# Patient Record
Sex: FEMALE
[De-identification: names, ages, dates, MRNs, and addresses within clinical notes are randomized per-mention and may not be internally consistent; named-entity substitution may affect disease eponyms.]

---

## 2018-03-13 ENCOUNTER — HOSPITAL ENCOUNTER (EMERGENCY)
Dept: HOSPITAL 88 - FSED | Age: 58
Discharge: HOME | End: 2018-03-13
Payer: COMMERCIAL

## 2018-03-13 VITALS — HEIGHT: 63 IN | BODY MASS INDEX: 31.89 KG/M2 | WEIGHT: 180 LBS

## 2018-03-13 VITALS — SYSTOLIC BLOOD PRESSURE: 132 MMHG | DIASTOLIC BLOOD PRESSURE: 78 MMHG

## 2018-03-13 DIAGNOSIS — R51: ICD-10-CM

## 2018-03-13 DIAGNOSIS — R11.0: ICD-10-CM

## 2018-03-13 DIAGNOSIS — I26.99: ICD-10-CM

## 2018-03-13 DIAGNOSIS — R10.30: Primary | ICD-10-CM

## 2018-03-13 PROCEDURE — 80053 COMPREHEN METABOLIC PANEL: CPT

## 2018-03-13 PROCEDURE — 76857 US EXAM PELVIC LIMITED: CPT

## 2018-03-13 PROCEDURE — 85379 FIBRIN DEGRADATION QUANT: CPT

## 2018-03-13 PROCEDURE — 85025 COMPLETE CBC W/AUTO DIFF WBC: CPT

## 2018-03-13 PROCEDURE — 74177 CT ABD & PELVIS W/CONTRAST: CPT

## 2018-03-13 PROCEDURE — 99284 EMERGENCY DEPT VISIT MOD MDM: CPT

## 2018-03-13 PROCEDURE — 81003 URINALYSIS AUTO W/O SCOPE: CPT

## 2018-03-13 NOTE — XMS REPORT
Clinical Summary

 Created on: 2018



Jarrett Giron

External Reference #: [id number]

: 1960

Sex: Female



Demographics







 Address  [address] 

[place]  [address]

 

 Home Phone  [phone]

 

 Preferred Language  English

 

 Marital Status  

 

 Congregation Affiliation  [id number]

 

 Race  White

 

 Ethnic Group  Non-





Author







 Author  [facility]

 

 Organization  [facility]

 

 Address  Unknown

 

 Phone  Unavailable







Support







 Name  Relationship  Address  Phone

 

 MackJoel  [employer]  [address]  [address]  [phone]

 

 BrionesMerline shelton  [employer]  Unknown  [phone]







Care Team Providers







 Care Team Member Name  Role  Phone

 

 Katharine Haro MD  PCP  [phone]







Allergies







    



  Active Allergy   Reactions   Severity   Noted Date   Comments

 

    



  Hydrocodone-Acetaminophen   Rash   Low   2016 







Current Medications







      



  Prescription   Sig.   Disp.   Refills   Start   End Date   Status



      Date  

 

      



  esomeprazole (NexIUM) 20   Take 20 mg by mouth daily     20    Active



  MG capsule   before breakfast.     14  

 

      



  solifenacin (VESICARE) 10   daily.     20    Active



  MG tablet      16  

 

      



  multivitamin with   Take 1 tablet by mouth       Active



  minerals tablet   daily.     

 

      



  calcium carbonate-vitamin   Take 1 tablet by mouth       Active



  D3 500 mg-200 unit per   daily.     



  tablet      

 

      



  magnesium oxide (MAG-OX)   Take 500 mg by mouth       Active



  400 mg tablet   daily.     







Active Problems





Not on file



Family History







   



  Medical History   Relation   Name   Comments

 

   



  Heart disease   Brother  

 

   



  Diabetes   Maternal  



   Grandfather  

 

   



  Breast cancer   Paternal Aunt  









   



  Relation   Name   Status   Comments

 

   



  Brother     MI at age 64

 

   



  Maternal Grandfather     

 

   



  Maternal Grandmother       Had a pacemaker;  at age 98

 

   



  Paternal Aunt     







Social History







    



  Tobacco Use   Types   Packs/Day   Years Used   Date

 

    



  Never Smoker    

 

    



  Smokeless Tobacco: Never   



  Used   









   



  Alcohol Use   Drinks/Week   oz/Week   Comments

 

   



  Yes   1 Glasses of   0.6   every 2 weeks



   wine  









 



  Sex Assigned at Birth   Date Recorded

 

 



  Not on file 







Last Filed Vital Signs

Not on file



Plan of Treatment







   



  Health Maintenance   Due Date   Last Done   Comments

 

   



  PAP SMEAR   1979  

 

   



  COLONOSCOPY   2008  

 

   



  MAMMOGRAM   2008  

 

   



  INFLUENZA VACCINE   2017  

 

   



  ZOSTER VACCINE   2018  







Results

Not on fileafter 2017



Insurance







     



  Payer   Benefit   Subscriber ID   Type   Phone   Address



   Plan /    



   Group    

 

     



  [employer]   [employer] PPO   xxxxxxxxxxx   PPO  









     



  Guarantor Name   Account   Relation to   Date of   Phone   Billing Address



   Type   Patient   Birth  

 

     



  JARRETT GIRON   Personal/F   Self   1960   Home:   [address]     [phone]   [place] [address]